# Patient Record
(demographics unavailable — no encounter records)

---

## 2024-10-21 NOTE — REASON FOR VISIT
[FreeTextEntry1] : The patient is here today for follow-up of coronary artery disease, hypertension and hypercholesterolemia.  Since undergoing placement of a stent in January 2020, the patient has been very physically active with no cardiopulmonary complaints or limitations.  The patient continues to exercise regularly and is generally in very good shape.  However, he has not been following an optimal diet and has put on 13 pounds since the time of his last visit.  In addition, the patient tends to drink alcohol occasionally in excess (he has 2 drinks a night during the week but may have up to 6 drinks on weekends.  We discussed the importance of reducing his alcohol intake for multiple reasons including for weight control.  He was recently started on amlodipine 5 mg daily for his blood pressure but his blood pressures continue to be elevated although he has not been checking it regularly at home.  The patient does describe some recent difficulties with pain in the joints of his hands which he thinks may be related to statins.  I recommend that he stop atorvastatin for 1 month to see whether there is any change and get back to me.  If there is significant improvement, would consider PCSK9 inhibitors.

## 2024-10-21 NOTE — PHYSICAL EXAM
[Well Developed] : well developed [Well Nourished] : well nourished [No Acute Distress] : no acute distress [Normal Conjunctiva] : normal conjunctiva [Normal Venous Pressure] : normal venous pressure [No Carotid Bruit] : no carotid bruit [Normal S1, S2] : normal S1, S2 [No Murmur] : no murmur [No Rub] : no rub [No Gallop] : no gallop [Clear Lung Fields] : clear lung fields [Good Air Entry] : good air entry [No Respiratory Distress] : no respiratory distress  [Soft] : abdomen soft [Non Tender] : non-tender [No Masses/organomegaly] : no masses/organomegaly [Normal Bowel Sounds] : normal bowel sounds [Normal Gait] : normal gait [No Edema] : no edema [No Cyanosis] : no cyanosis [No Clubbing] : no clubbing [No Varicosities] : no varicosities [No Rash] : no rash [No Skin Lesions] : no skin lesions [Moves all extremities] : moves all extremities [No Focal Deficits] : no focal deficits [Normal Speech] : normal speech [Alert and Oriented] : alert and oriented [Normal memory] : normal memory [TextEntry] : General/Constitutional: WD/ WN in NAD H: NC/AT Eyes:  PERRL, sclerae and conjunctivae normal without jaundice or xanthelasma; ENMT:normal teeth, gums and palate with no petechiae, pallor or cyanosis Neck: w/o JVD, thyromegaly or adenopathy; normal venous contour Respiratory: clear to auscultation, normal respiratory effort with no retractions or use of accessory respiratory muscles Heart: [id number], regular rate, normal S1, S2 without murmurs, rubs, gallops, heaves or thrills Vascular exam: normal carotid upstrokes without carotid or abdominal bruits. 2+/2+ pulses to posterior tibialis and dorsalis pedis Abdomen: soft, nontender, bowels sounds normal without hepatomegaly or splenomegaly, masses or bruits Musculoskeletal:without significant kyphosis or scoliosis Extremities: w/o CCE, good capillary filling Skin: no stasis changes; no ulcers  Neuro: AA and O x 3; no focal neurologic deficits Psych: normal mood and affect

## 2024-10-21 NOTE — DISCUSSION/SUMMARY
[FreeTextEntry1] : Coronary artery disease-clinically stable.  The patient continues to be extremely active..  Continue activity level. Hypertension-the patient's blood pressure is significantly elevated today.  He will begin checking his blood pressures at home and email me readings.  Further recommendations pending those readings. Hypercholesterolemia-as above Return visit 6 months.  Continue current regimen. EKG today is unchanged.   Total time of the encounter: 30 minutes which included but was not limited to the following: Face-to-face and non face-to-face time personally spent by the physician preparing to see the patient, obtaining and/or resuming separately obtained history, performing a medically appropriate examination and/or evaluation, counseling and educating the patient/family/caregiver, ordering medications, tests or procedures, referring and communicating with other healthcare professionals, documenting clinical information in the electronic health record, independently interpreting results and communicated results to the patient/family/caregiver and care coordination. [EKG obtained to assist in diagnosis and management of assessed problem(s)] : EKG obtained to assist in diagnosis and management of assessed problem(s)

## 2024-10-21 NOTE — REVIEW OF SYSTEMS
[Weight Gain (___ Lbs)] : [unfilled] ~Ulb weight gain [Negative] : Heme/Lymph [TextEntry] : Except as noted above... Constitutional: The patient denied headache, fatigue, fever, sweats, loss of appetite or chills Eyes: The patient denied double vision, eye pain, eye discharge, red eyes or itchy eyes ENT: The patient denied ear pain, ear discharge, nasal congestion, nasal discharge, sore throat, enlarged tonsils, hoarseness, neck pain or neck swelling Cardiovascular: The patient denied chest pain, chest discomfort, dizziness, palpitations, fainting  or leg cramps Respiratory: The patient denied shortness of breath, cough, coughing up blood, wheezing, chest congestion or mucous production GI: The patient denied abdominal pain, nausea, vomiting, diarrhea, constipation, black stools or bloody stools : The patient denied pain on urination, burning with urination, frequent urination or blood in the urine Skin: The patient denied rashes, redness or swelling Neurologic: The patient denied headache, stiff neck, weakness, numbness, difficulty speaking, unsteadiness or numbness/tingling in feet Psychiatric: The patient denied hallucinations, agitation or disorientation Endocrine: The patient denied excessive thirst, excessive urination, cold intolerance or heat intolerance Hematologic: The patient denied easy bruisability or pallor Allergic/Immunologic: The patient denied runny nose, recurrent infections, hives or pruritis Musculoskeletal: The patient denied arthritic pains, muscle weakness or muscle aches Extremities: The patient denied clubbing, cyanosis or lower extremity swelling

## 2024-11-08 NOTE — ASSESSMENT
[FreeTextEntry1] : 1. Hypothyroidism - Levothyroxine 88 mcg daily - we discussed the importance of medication hygiene:  Take levothyroxine with water on an empty stomach, at least 30 minutes before any food/beverages or other medication intake. - check TSH, free T4 for dose adjustment every 6 to 12 months  2.  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 DEXA scan 1/26/2023 with osteopenia at lumbar spine T score -1.5 and osteoporosis at distal radius T score -2.7. - We discussed management of osteoporosis.  Given isolated low T score at distal radius, he prefers to hold off on treatment given no history of fractures. -Recommend repeating DEXA scan at next visit -Recommend vitamin D supplements, calcium intake in diet -Recommend weightbearing exercises, fall precautions  3. Hyperlipidemia, CAD status post PCI -Following with cardiologist, atorvastatin currently on hold  4.  Obesity, weight gain -Continue lifestyle modifications for weight loss such as dietary modification and increased physical exercise.  He is working on keto diet right now.  We briefly discussed medications for weight loss, but he would like to focus on lifestyle interventions for now.  RTC in 1 year with same-day DEXA scan.  Berta Gong MD [facility] [place]  [address] [place] [address], [address] Ph: [phone] Fax: [phone]

## 2024-11-08 NOTE — HISTORY OF PRESENT ILLNESS
[FreeTextEntry1] : CHIEF COMPLAINT: Hypothyroidism  HISTORY OF PRESENTING ILLNESS: The patient is a 62-year-old male being seen in the office today for evaluation of hypothyroidism and hypogonadism.  HYPOTHYROIDISM: Diagnosed with hypothyroidism in his 30s.  Family history of hypothyroidism in 2 sisters, 1 brother and mother.  Currently taking 88 mcg of levothyroxine daily, takes it with other medications.  Denies any specific hypothyroidism or hyperthyroidism symptoms.  Endorses weight gain of 12 to 15 pounds over the last year, has been doing keto diet to lose weight.  No palpitations, endorses chronic tremors.  No heat or cold intolerance, no constipation or diarrhea.  Chronic hair loss.  Energy levels are good.  No neck swelling, no compressive symptoms.  Does not take biotin.  No radiation history to the head and neck area.  No history of thyroid nodules known to the patient.         Normal TFTs from 10/2024.                                                                       HYPERLIPIDEMIA: Previously taking atorvastatin 40 mg daily, currently on hold as he was having pain/arthritis in her knuckles which improved after stopping statin.  He has a history of CAD status post PCI.  Follows with cardiology.  OSTEOPOROSIS: DEXA scan 1/26/2023 LS -1.5, distal radius -2.7 He has bilateral hip replacements Impression: Osteopenia at spine, osteoporosis at proximal radius No history of any fractures.  He exercises regularly, does cardio and weightlifting 5-6 times a week. He takes vitamin D supplements, no calcium supplements.  Takes calcium in diet. Denies family history of osteoporosis or hip fracture. He is a non-smoker.  He has never been on osteoporosis medications.  HYPOGONADISM: He is managing with Dr. Parker from urology.  Currently on topical testosterone daily.  He prefers to manage with his urologist regarding testosterone deficiency.  No current symptoms of hypogonadism with testosterone replacement.

## 2024-12-19 NOTE — HISTORY OF PRESENT ILLNESS
[Home] : at home, [unfilled] , at the time of the visit. [Medical Office: ([facility])___] : at the medical office located in  [Verbal consent obtained from patient] : the patient, [unfilled] [FreeTextEntry1] : The patient-doctor. relationship has been established in a face-to-face fashion on real-time video audio HIPAA compliant communication using telemedicine software. The patient, Sanya Crockett was at home and participated in the telephonic visit with the Physician Neal Parker MD PhD who was in his medical office. The patient's identity has been confirmed.  The patient was previously emailed a copy of the telemedicine consent.  The patient has had a chance to review and has now given verbal consent and has requested care to be assessed and treated through telemedicine. The patient understands there may be limitations in this process and that they need not need further follow-up care in the office and/or hospital settings.   Verbal consent was given on Thursday,December 19 , 2024 at 9 AM by the patient.  It was requested by the physician.  A written consent was previously sent for the patient to sign and return.  The patient returns for follow-up.  He was last seen September 26, 2024 he is using 2 pumps daily of transdermal gel.  He feels well and has no complaints.    PMH: Patient initially presented with the chief complaint of low testosterone for evaluation. he has symptoms of decreased libido and energy. He had been taking 1 packet of gel daily and stopped 6 months ago.  He is interested in re-starting testosterone.  The patient denies fevers, chills, nausea and/or vomiting and no unexplained weight loss. He has no known drug allergies.  His past medical history demonstrates no significant urologic issues.  In his present occupation as a he has no known toxin exposure.  He does smoke and drinks only socially.  He has no known drug allergies.  His review of systems is non-contributory. His family history is not significant.

## 2024-12-19 NOTE — ASSESSMENT
[FreeTextEntry1] : The patient returns for follow-up.  He was last seen September 26, 2024 he is using 2 pumps daily of transdermal gel.  He feels well and has no complaints  Blood studies dated June 6, 2024 were good except for slightly low free testosterone of 2.2 pg/mL the patient was unable to obtain blood studies previously I have ordered them again today and will review the results with him in 2 weeks.  I have renewed his testosterone..  REF: [id number]  Telehealth Consultation: 35 minutes reviewing his history and discussing prior results, discussing various treatment options, reviewing his recent lab testing and writing his note. There was also additional time in preparing for the visit and assisting the patient with technology issues he was having with the telehealth platform.

## 2024-12-19 NOTE — HISTORY OF PRESENT ILLNESS
[Home] : at home, [unfilled] , at the time of the visit. [Medical Office: ([facility])___] : at the medical office located in  [Verbal consent obtained from patient] : the patient, [unfilled] [FreeTextEntry1] : The patient-doctor. relationship has been established in a face-to-face fashion on real-time video audio HIPAA compliant communication using telemedicine software. The patient, Sanya Crockett was at home and participated in the telephonic visit with the Physician Neal Parker MD PhD who was in his medical office. The patient's identity has been confirmed.  The patient was previously emailed a copy of the telemedicine consent.  The patient has had a chance to review and has now given verbal consent and has requested care to be assessed and treated through telemedicine. The patient understands there may be limitations in this process and that they need not need further follow-up care in the office and/or hospital settings.   Verbal consent was given on Thursday,December 19 , 2024 at 9 AM by the patient.  It was requested by the physician.  A written consent was previously sent for the patient to sign and return.  The patient returns for follow-up.  He was last seen September 26, 2024 he is using 2 pumps daily of transdermal gel.  He feels well and has no complaints.    PMH: Patient initially presented with the chief complaint of low testosterone for evaluation. he has symptoms of decreased libido and energy. He had been taking 1 packet of gel daily and stopped 6 months ago.  He is interested in re-starting testosterone.  The patient denies fevers, chills, nausea and/or vomiting and no unexplained weight loss. He has no known drug allergies.  His past medical history demonstrates no significant urologic issues.  In his present occupation as a he has no known toxin exposure.  He does smoke and drinks only socially.  He has no known drug allergies.  His review of systems is non-contributory. His family history is not significant.

## 2024-12-19 NOTE — ASSESSMENT
[FreeTextEntry1] : The patient returns for follow-up.  He was last seen September 26, 2024 he is using 2 pumps daily of transdermal gel.  He feels well and has no complaints  Blood studies dated June 6, 2024 were good except for slightly low free testosterone of 2.2 pg/mL the patient was unable to obtain blood studies previously I have ordered them again today and will review the results with him in 2 weeks.  I have renewed his testosterone..  REF: [id number]  Telehealth Consultation: 35 minutes reviewing his history and discussing prior results, discussing various treatment options, reviewing his recent lab testing and writing his note. There was also additional time in preparing for the visit and assisting the patient with technology issues he was having with the telehealth platform.

## 2025-01-18 NOTE — PHYSICAL EXAM
[General Appearance - Well Developed] : well developed [General Appearance - Well Nourished] : well nourished [Normal Appearance] : normal appearance [General Appearance - In No Acute Distress] : no acute distress [Well Groomed] : well groomed [Oriented To Time, Place, And Person] : oriented to person, place, and time [Affect] : the affect was normal [Mood] : the mood was normal [Not Anxious] : not anxious

## 2025-01-23 NOTE — ASSESSMENT
[FreeTextEntry1] : The patient returns for follow-up.  He was last seen December 19 , 2024 he is using 2 pumps daily of transdermal gel. He feels well and has no complaints.  We needed to review his blood studies today.  However, he mentioned that he had taken twice the amount of medication the day before he got his bloods drawn.  Blood studies dated January 21, 2025 demonstrated a TSH of 3.9 IU/mL, PSA 1.1 ng/mL, estradiol 29 pg/mL, prolactin 7.7 ng/mL, vitamin D 25 was 56.2 ng/mL, he had dyslipidemia and medical evaluation was suggested.  His LH was less than 0.3 IU/L, hematocrit 46.3%, hemoglobin A1c 5.2%.  His free testosterone was 24 pg/mL with a total testosterone of 1231 ng/dL more most likely due to the fact that he took twice dose the day before.  He stated he will not do that again.  I will see him back in 8 weeks for a renewal of his medication.  Telehealth Consultation: 35 minutes reviewing his history and discussing prior results, discussing various treatment options, reviewing his recent lab testing and writing his note. There was also additional time in preparing for the visit and assisting the patient with technology issues he was having with the telehealth platform.

## 2025-01-23 NOTE — HISTORY OF PRESENT ILLNESS
[Home] : at home, [unfilled] , at the time of the visit. [Medical Office: ([facility])___] : at the medical office located in  [Verbal consent obtained from patient] : the patient, [unfilled] [FreeTextEntry1] : The patient-doctor. relationship has been established in a face-to-face fashion on real-time video audio HIPAA compliant communication using telemedicine software. The patient, Sanya Crockett was at home and participated in the telephonic visit with the Physician Neal Parker MD PhD who was in his medical office. The patient's identity has been confirmed.  The patient was previously emailed a copy of the telemedicine consent.  The patient has had a chance to review and has now given verbal consent and has requested care to be assessed and treated through telemedicine. The patient understands there may be limitations in this process and that they need not need further follow-up care in the office and/or hospital settings.   Verbal consent was given on Thursday,December 19 , 2024 at 9 AM by the patient.  It was requested by the physician.  A written consent was previously sent for the patient to sign and return.  The patient returns for follow-up.  He was last seen December 19 , 2024 he is using 2 pumps daily of transdermal gel. He feels well and has no complaints.  We needed to review his blood studies today.  However, he mentioned that he had taken twice the amount of medication the day before he got his bloods drawn.  PMH: Patient initially presented with the chief complaint of low testosterone for evaluation. he has symptoms of decreased libido and energy. He had been taking 1 packet of gel daily and stopped 6 months ago.  He is interested in re-starting testosterone.  The patient denies fevers, chills, nausea and/or vomiting and no unexplained weight loss. He has no known drug allergies.  His past medical history demonstrates no significant urologic issues.  In his present occupation as a he has no known toxin exposure.  He does smoke and drinks only socially.  He has no known drug allergies.  His review of systems is non-contributory. His family history is not significant.

## 2025-01-23 NOTE — HISTORY OF PRESENT ILLNESS
[Home] : at home, [unfilled] , at the time of the visit. [Medical Office: ([facility])___] : at the medical office located in  [Verbal consent obtained from patient] : the patient, [unfilled] [FreeTextEntry1] : The patient-doctor. relationship has been established in a face-to-face fashion on real-time video audio HIPAA compliant communication using telemedicine software. The patient, Sanya Crockett was at home and participated in the telephonic visit with the Physician Neal Parker MD PhD who was in his medical office. The patient's identity has been confirmed.  The patient was previously emailed a copy of the telemedicine consent.  The patient has had a chance to review and has now given verbal consent and has requested care to be assessed and treated through telemedicine. The patient understands there may be limitations in this process and that they need not need further follow-up care in the office and/or hospital settings.   Verbal consent was given on Thursday,December 19 , 2024 at 9 AM by the patient.  It was requested by the physician.  A written consent was previously sent for the patient to sign and return.  The patient returns for follow-up.  He was last seen December 19 , 2024 he is using 2 pumps daily of transdermal gel. He feels well and has no complaints.  We needed to review his blood studies today.  However, he mentioned that he had taken twice the amount of medication the day before he got his bloods drawn.  PMH: Patient initially presented with the chief complaint of low testosterone for evaluation. he has symptoms of decreased libido and energy. He had been taking 1 packet of gel daily and stopped 6 months ago.  He is interested in re-starting testosterone.  The patient denies fevers, chills, nausea and/or vomiting and no unexplained weight loss. He has no known drug allergies.  His past medical history demonstrates no significant urologic issues.  In his present occupation as a he has no known toxin exposure.  He does smoke and drinks only socially.  He has no known drug allergies.  His review of systems is non-contributory. His family history is not significant.

## 2025-03-20 NOTE — HISTORY OF PRESENT ILLNESS
[Home] : at home, [unfilled] , at the time of the visit. [Medical Office: ([facility])___] : at the medical office located in  [Verbal consent obtained from patient] : the patient, [unfilled] [FreeTextEntry1] : The patient-doctor. relationship has been established in a face-to-face fashion on real-time video audio HIPAA compliant communication using telemedicine software. The patient, Sanya Crockett was at home and participated in the telephonic visit with the Physician Neal Parker MD PhD who was in his medical office. The patient's identity has been confirmed.  The patient was previously emailed a copy of the telemedicine consent.  The patient has had a chance to review and has now given verbal consent and has requested care to be assessed and treated through telemedicine. The patient understands there may be limitations in this process and that they need not need further follow-up care in the office and/or hospital settings.   Verbal consent was given on Thursday, March 18, 2025 at 9 AM by the patient.  It was requested by the physician.  A written consent was previously sent for the patient to sign and return.  The patient returns for follow-up.  He was last seen January 23, 2024 he is using 2 pumps daily of transdermal gel. He feels well and has no complaints. However, he mentioned that he had taken twice the amount of medication the day before he got his bloods drawn.  PMH: Patient initially presented with the chief complaint of low testosterone for evaluation. he has symptoms of decreased libido and energy. He had been taking 1 packet of gel daily and stopped 6 months ago.  He is interested in re-starting testosterone.  The patient denies fevers, chills, nausea and/or vomiting and no unexplained weight loss. He has no known drug allergies.  His past medical history demonstrates no significant urologic issues.  In his present occupation as a he has no known toxin exposure.  He does smoke and drinks only socially.  He has no known drug allergies.  His review of systems is non-contributory. His family history is not significant.

## 2025-03-20 NOTE — ASSESSMENT
[FreeTextEntry1] : The patient returns for follow-up.  He was last seen January 23, 2024 he is using 2 pumps daily of transdermal gel. He feels well and has no complaints. However, he mentioned that he had taken twice the amount of medication the day before he got his bloods drawn.  Blood studies dated January 21, 2025 demonstrated a TSH of 3.9 IU/mL, PSA 1.1 ng/mL, estradiol 29 pg/mL, prolactin 7.7 ng/mL, vitamin D 25 was 56.2 ng/mL, he had dyslipidemia and medical evaluation was suggested.  His LH was less than 0.3 IU/L, hematocrit 46.3%, hemoglobin A1c 5.2%.  His free testosterone was 24 pg/mL with a total testosterone of 1231 ng/dL more most likely due to the fact that he took twice dose the day before.  He stated he will not do that again.  Since his testosterone was up last time I have written for repeat blood testing.  I reviewed we review it with him in 2 weeks.  I have also renewed his testosterone will see him back in 10 weeks in follow-up.  REF::[id number]  Telehealth Consultation: 35 minutes reviewing his history and discussing prior results, discussing various treatment options, reviewing his recent lab testing and writing his note. There was also additional time in preparing for the visit and assisting the patient with technology issues he was having with the telehealth platform.

## 2025-03-20 NOTE — HISTORY OF PRESENT ILLNESS
[Home] : at home, [unfilled] , at the time of the visit. [Medical Office: ([facility])___] : at the medical office located in  [Verbal consent obtained from patient] : the patient, [unfilled] [FreeTextEntry1] : The patient-doctor. relationship has been established in a face-to-face fashion on real-time video audio HIPAA compliant communication using telemedicine software. The patient, Sanya Crockett was at home and participated in the telephonic visit with the Physician Neal Parker MD PhD who was in his medical office. The patient's identity has been confirmed.  The patient was previously emailed a copy of the telemedicine consent.  The patient has had a chance to review and has now given verbal consent and has requested care to be assessed and treated through telemedicine. The patient understands there may be limitations in this process and that they need not need further follow-up care in the office and/or hospital settings.   Verbal consent was given on Thursday, March 18, 2025 at 9 AM by the patient.  It was requested by the physician.  A written consent was previously sent for the patient to sign and return.  The patient returns for follow-up.  He was last seen January 23, 2024 he is using 2 pumps daily of transdermal gel. He feels well and has no complaints. However, he mentioned that he had taken twice the amount of medication the day before he got his bloods drawn.  PMH: Patient initially presented with the chief complaint of low testosterone for evaluation. he has symptoms of decreased libido and energy. He had been taking 1 packet of gel daily and stopped 6 months ago.  He is interested in re-starting testosterone.  The patient denies fevers, chills, nausea and/or vomiting and no unexplained weight loss. He has no known drug allergies.  His past medical history demonstrates no significant urologic issues.  In his present occupation as a he has no known toxin exposure.  He does smoke and drinks only socially.  He has no known drug allergies.  His review of systems is non-contributory. His family history is not significant.

## 2025-03-20 NOTE — ASSESSMENT
[FreeTextEntry1] : The patient returns for follow-up.  He was last seen January 23, 2024 he is using 2 pumps daily of transdermal gel. He feels well and has no complaints. However, he mentioned that he had taken twice the amount of medication the day before he got his bloods drawn.  Blood studies dated January 21, 2025 demonstrated a TSH of 3.9 IU/mL, PSA 1.1 ng/mL, estradiol 29 pg/mL, prolactin 7.7 ng/mL, vitamin D 25 was 56.2 ng/mL, he had dyslipidemia and medical evaluation was suggested.  His LH was less than 0.3 IU/L, hematocrit 46.3%, hemoglobin A1c 5.2%.  His free testosterone was 24 pg/mL with a total testosterone of 1231 ng/dL more most likely due to the fact that he took twice dose the day before.  He stated he will not do that again.  Since his testosterone was up last time I have written for repeat blood testing.  I reviewed we review it with him in 2 weeks.  I have also renewed his testosterone will see him back in 10 weeks in follow-up.  REF::[id number]  Telehealth Consultation: 35 minutes reviewing his history and discussing prior results, discussing various treatment options, reviewing his recent lab testing and writing his note. There was also additional time in preparing for the visit and assisting the patient with technology issues he was having with the telehealth platform.

## 2025-04-21 NOTE — PHYSICAL EXAM
[TextEntry] : General/Constitutional: WD/obese in NAD H: NC/AT Eyes:  PERRL, sclerae and conjunctivae normal without jaundice or xanthelasma; ENMT:normal teeth, gums and palate with no petechiae, pallor or cyanosis Neck: w/o JVD, thyromegaly or adenopathy; normal venous contour Respiratory: clear to auscultation, normal respiratory effort with no retractions or use of accessory respiratory muscles Heart: [id number], regular rate, normal S1, S2 without murmurs, rubs, gallops, heaves or thrills Vascular exam: normal carotid upstrokes without carotid or abdominal bruits. 2+/2+ pulses to posterior tibialis and dorsalis pedis Abdomen: soft, nontender, bowels sounds normal without hepatomegaly or splenomegaly, masses or bruits Musculoskeletal:without significant kyphosis or scoliosis Extremities: w/o CC 1+ bilateral lower extremity edema to the mid calf, good capillary filling Skin: no stasis changes; no ulcers  Neuro: AA and O x 3; no focal neurologic deficits Psych: normal mood and affect

## 2025-04-21 NOTE — DISCUSSION/SUMMARY
[FreeTextEntry1] : Coronary artery disease-clinically stable.  The patient continues to be extremely active..  Continue activity level. Hypertension-the patient's blood pressure continues to be elevated.  Discontinue amlodipine begin valsartan /25 daily. Hypercholesterolemia-as above Return visit 4 months.  Continue current regimen. EKG today is unchanged.   Total time of the encounter: 30 minutes which included but was not limited to the following: Face-to-face and non face-to-face time personally spent by the physician preparing to see the patient, obtaining and/or resuming separately obtained history, performing a medically appropriate examination and/or evaluation, counseling and educating the patient/family/caregiver, ordering medications, tests or procedures, referring and communicating with other healthcare professionals, documenting clinical information in the electronic health record, independently interpreting results and communicated results to the patient/family/caregiver and care coordination. [EKG obtained to assist in diagnosis and management of assessed problem(s)] : EKG obtained to assist in diagnosis and management of assessed problem(s)

## 2025-04-21 NOTE — REASON FOR VISIT
[FreeTextEntry1] : The patient is here today for follow-up of coronary artery disease, hypertension and hypercholesterolemia.  Since undergoing placement of a stent in January 2020, the patient has been very physically active with no cardiopulmonary complaints or limitations.  The patient continues to exercise regularly and is generally in very good shape.  He continues have difficulty with his blood pressure and leg swelling.  He is on amlodipine 10 mg daily.  At this time, we will discontinue amlodipine and start valsartan /25 for his blood pressure.  The patient has been having significant swelling in his hands.  After stopping atorvastatin, the swelling resolved completely.  However, his cholesterol monroe significantly.  Patient will be started on Leqvio.  The patient's son who lives with him and has emotional and psychological difficulties has been adding a lot of stress to his life.

## 2025-05-02 NOTE — REASON FOR VISIT
[FreeTextEntry1] : Dx: Hypercholesterolemia that is not adequately treatable with statins of [place] Procedure: Leqvio 284 mg SC NDC 4542-4388-73  Lot number: [id number] Location: left deltoid Expiration date : 11/30/2026 code J 1306 Administration code: 98232

## 2025-07-01 NOTE — ASSESSMENT
[FreeTextEntry1] : The patient returns for follow-up.  He was last seen March 20, 2024 he is using 2 pumps daily of transdermal gel. He feels well and has no complaints. However, he mentioned that he had taken twice the amount of medication the day before he got his bloods drawn.  He had blood test April 11, 2025 that we needed to review.  Laboratory studies dated April 11, 2025 demonstrated a hematocrit of 44%, estradiol 20 pg/mL, PSA 0.77 ng/mL, prolactin 9.7 ng/mL, vitamin D 25 was 57.4 ng/mL, LH less than 0.3 IU/L, free testosterone was 4.5 pg/mL with a total testosterone of 601 ng/dL.   Blood studies dated January 21, 2025 demonstrated a TSH of 3.9 IU/mL, PSA 1.1 ng/mL, estradiol 29 pg/mL, prolactin 7.7 ng/mL, vitamin D 25 was 56.2 ng/mL, he had dyslipidemia and medical evaluation was suggested.  His LH was less than 0.3 IU/L, hematocrit 46.3%, hemoglobin A1c 5.2%.  His free testosterone was 24 pg/mL with a total testosterone of 1231 ng/dL more most likely due to the fact that he took twice dose the day before.  He stated he will not do that again.  Since his testosterone was up last time I have written for repeat blood testing.  I reviewed we review it with him in 2 weeks.  I have also renewed his testosterone will see him back in 10 weeks in follow-up.  REF: [id number]  Telehealth Consultation: 35 minutes reviewing his history and discussing prior results, discussing various treatment options, reviewing his recent lab testing and writing his note. There was also additional time in preparing for the visit and assisting the patient with technology issues he was having with the telehealth platform.

## 2025-07-01 NOTE — HISTORY OF PRESENT ILLNESS
[Home] : at home, [unfilled] , at the time of the visit. [Medical Office: ([facility])___] : at the medical office located in  [Verbal consent obtained from patient] : the patient, [unfilled] [FreeTextEntry1] : The patient-doctor. relationship has been established in a face-to-face fashion on real-time video audio HIPAA compliant communication using telemedicine software. The patient, Sanya Crockett was at home and participated in the telephonic visit with the Physician Neal Parker MD PhD who was in his medical office. The patient's identity has been confirmed.  The patient was previously emailed a copy of the telemedicine consent.  The patient has had a chance to review and has now given verbal consent and has requested care to be assessed and treated through telemedicine. The patient understands there may be limitations in this process and that they need not need further follow-up care in the office and/or hospital settings.   Verbal consent was given on Tuesday, July 1, 2025 at 9 AM by the patient.  It was requested by the physician.  A written consent was previously sent for the patient to sign and return.  The patient returns for follow-up.  He was last seen March 20, 2024 he is using 2 pumps daily of transdermal gel. He feels well and has no complaints. However, he mentioned that he had taken twice the amount of medication the day before he got his bloods drawn.  He had blood test April 11, 2025 that we needed to review.  PMH: Patient initially presented with the chief complaint of low testosterone for evaluation. he has symptoms of decreased libido and energy. He had been taking 1 packet of gel daily and stopped 6 months ago.  He is interested in re-starting testosterone.  The patient denies fevers, chills, nausea and/or vomiting and no unexplained weight loss. He has no known drug allergies.  His past medical history demonstrates no significant urologic issues.  In his present occupation as a he has no known toxin exposure.  He does smoke and drinks only socially.  He has no known drug allergies.  His review of systems is non-contributory. His family history is not significant.

## 2025-07-01 NOTE — HISTORY OF PRESENT ILLNESS
[Home] : at home, [unfilled] , at the time of the visit. [Medical Office: ([facility])___] : at the medical office located in  [Verbal consent obtained from patient] : the patient, [unfilled] [FreeTextEntry1] : The patient-doctor. relationship has been established in a face-to-face fashion on real-time video audio HIPAA compliant communication using telemedicine software. The patient, Sanya Crockett was at home and participated in the telephonic visit with the Physician Neal Parker MD PhD who was in his medical office. The patient's identity has been confirmed.  The patient was previously emailed a copy of the telemedicine consent.  The patient has had a chance to review and has now given verbal consent and has requested care to be assessed and treated through telemedicine. The patient understands there may be limitations in this process and that they need not need further follow-up care in the office and/or hospital settings.   Verbal consent was given on Tuesday, July 1, 2025 at 9 AM by the patient.  It was requested by the physician.  A written consent was previously sent for the patient to sign and return.  The patient returns for follow-up.  He was last seen March 20, 2024 he is using 2 pumps daily of transdermal gel. He feels well and has no complaints. However, he mentioned that he had taken twice the amount of medication the day before he got his bloods drawn.  He had blood test April 11, 2025 that we needed to review.  PMH: Patient initially presented with the chief complaint of low testosterone for evaluation. he has symptoms of decreased libido and energy. He had been taking 1 packet of gel daily and stopped 6 months ago.  He is interested in re-starting testosterone.  The patient denies fevers, chills, nausea and/or vomiting and no unexplained weight loss. He has no known drug allergies.  His past medical history demonstrates no significant urologic issues.  In his present occupation as a he has no known toxin exposure.  He does smoke and drinks only socially.  He has no known drug allergies.  His review of systems is non-contributory. His family history is not significant.

## 2025-07-01 NOTE — ASSESSMENT
[FreeTextEntry1] : The patient returns for follow-up.  He was last seen March 20, 2024 he is using 2 pumps daily of transdermal gel. He feels well and has no complaints. However, he mentioned that he had taken twice the amount of medication the day before he got his bloods drawn.  He had blood test April 11, 2025 that we needed to review.  Laboratory studies dated April 11, 2025 demonstrated a hematocrit of 44%, estradiol 20 pg/mL, PSA 0.77 ng/mL, prolactin 9.7 ng/mL, vitamin D 25 was 57.4 ng/mL, LH less than 0.3 IU/L, free testosterone was 4.5 pg/mL with a total testosterone of 601 ng/dL.   Blood studies dated January 21, 2025 demonstrated a TSH of 3.9 IU/mL, PSA 1.1 ng/mL, estradiol 29 pg/mL, prolactin 7.7 ng/mL, vitamin D 25 was 56.2 ng/mL, he had dyslipidemia and medical evaluation was suggested.  His LH was less than 0.3 IU/L, hematocrit 46.3%, hemoglobin A1c 5.2%.  His free testosterone was 24 pg/mL with a total testosterone of 1231 ng/dL more most likely due to the fact that he took twice dose the day before.  He stated he will not do that again.  Since his testosterone was up last time I have written for repeat blood testing.  I reviewed we review it with him in 2 weeks.  I have also renewed his testosterone will see him back in 10 weeks in follow-up.  REF: [id number]  Telehealth Consultation: 35 minutes reviewing his history and discussing prior results, discussing various treatment options, reviewing his recent lab testing and writing his note. There was also additional time in preparing for the visit and assisting the patient with technology issues he was having with the telehealth platform.